# Patient Record
Sex: FEMALE | Race: BLACK OR AFRICAN AMERICAN | Employment: UNEMPLOYED
[De-identification: names, ages, dates, MRNs, and addresses within clinical notes are randomized per-mention and may not be internally consistent; named-entity substitution may affect disease eponyms.]

---
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
## 2023-05-07 NOTE — ED NOTES
Patient reports 4 day history of left index finger pain and right side abdominal pain with vomiting. Last emesis and BM were yesterday. Finger and abdominal pain both began after catching falling weights in the gym.   [id number]